# Patient Record
(demographics unavailable — no encounter records)

---

## 2025-01-24 NOTE — CONSULT LETTER
[Dear  ___] : Dear  [unfilled], [Consult Letter:] : I had the pleasure of evaluating your patient, [unfilled]. [Please see my note below.] : Please see my note below. [Consult Closing:] : Thank you very much for allowing me to participate in the care of this patient.  If you have any questions, please do not hesitate to contact me. [Sincerely,] : Sincerely, [FreeTextEntry3] : Kathy Hernandez MD Director, Pediatric Epilepsy [facility] , Pediatric Neurology Residency , [employer] [facility] at [address], [address] [place] [address] Phone: [phone] Fax: [phone]

## 2025-01-24 NOTE — PHYSICAL EXAM
[Well-appearing] : well-appearing [Normocephalic] : normocephalic [No dysmorphic facial features] : no dysmorphic facial features [No ocular abnormalities] : no ocular abnormalities [Neck supple] : neck supple [Soft] : soft [No organomegaly] : no organomegaly [Straight] : straight [No deformities] : no deformities [Alert] : alert [Well related, good eye contact] : well related, good eye contact [Conversant] : conversant [Normal speech and language] : normal speech and language [Follows instructions well] : follows instructions well [VFF] : VFF [Pupils reactive to light and accommodation] : pupils reactive to light and accommodation [Full extraocular movements] : full extraocular movements [Saccadic and smooth pursuits intact] : saccadic and smooth pursuits intact [No nystagmus] : no nystagmus [No papilledema] : no papilledema [Normal facial sensation to light touch] : normal facial sensation to light touch [No facial asymmetry or weakness] : no facial asymmetry or weakness [Gross hearing intact] : gross hearing intact [Equal palate elevation] : equal palate elevation [Good shoulder shrug] : good shoulder shrug [Normal tongue movement] : normal tongue movement [Midline tongue, no fasciculations] : midline tongue, no fasciculations [Normal axial and appendicular muscle tone] : normal axial and appendicular muscle tone [Gets up on table without difficulty] : gets up on table without difficulty [No pronator drift] : no pronator drift [Normal finger tapping and fine finger movements] : normal finger tapping and fine finger movements [No abnormal involuntary movements] : no abnormal involuntary movements [5/5 strength in proximal and distal muscles of arms and legs] : 5/5 strength in proximal and distal muscles of arms and legs [2+ biceps] : 2+ biceps [Triceps] : triceps [Knee jerks] : knee jerks [Ankle jerks] : ankle jerks [No ankle clonus] : no ankle clonus [Localizes LT and temperature] : localizes LT and temperature [No dysmetria on FTNT] : no dysmetria on FTNT [Good walking balance] : good walking balance [Normal gait] : normal gait [Able to tandem well] : able to tandem well [Negative Romberg] : negative Romberg [de-identified] : large cafe au lait spot on one side of the midline in lumbat region

## 2025-01-24 NOTE — ASSESSMENT
[FreeTextEntry1] : 14 yo boy with two  episodes of possible myoclonic seizures vs another etiology ( cardiac/ vasovagal).  I will get REEG and AEEG.

## 2025-01-24 NOTE — PHYSICAL EXAM
[Well-appearing] : well-appearing [Normocephalic] : normocephalic [No dysmorphic facial features] : no dysmorphic facial features [No ocular abnormalities] : no ocular abnormalities [Neck supple] : neck supple [Soft] : soft [No organomegaly] : no organomegaly [Straight] : straight [No deformities] : no deformities [Alert] : alert [Well related, good eye contact] : well related, good eye contact [Conversant] : conversant [Normal speech and language] : normal speech and language [Follows instructions well] : follows instructions well [VFF] : VFF [Pupils reactive to light and accommodation] : pupils reactive to light and accommodation [Full extraocular movements] : full extraocular movements [Saccadic and smooth pursuits intact] : saccadic and smooth pursuits intact [No nystagmus] : no nystagmus [No papilledema] : no papilledema [Normal facial sensation to light touch] : normal facial sensation to light touch [No facial asymmetry or weakness] : no facial asymmetry or weakness [Gross hearing intact] : gross hearing intact [Equal palate elevation] : equal palate elevation [Good shoulder shrug] : good shoulder shrug [Normal tongue movement] : normal tongue movement [Midline tongue, no fasciculations] : midline tongue, no fasciculations [Normal axial and appendicular muscle tone] : normal axial and appendicular muscle tone [Gets up on table without difficulty] : gets up on table without difficulty [No pronator drift] : no pronator drift [Normal finger tapping and fine finger movements] : normal finger tapping and fine finger movements [No abnormal involuntary movements] : no abnormal involuntary movements [5/5 strength in proximal and distal muscles of arms and legs] : 5/5 strength in proximal and distal muscles of arms and legs [2+ biceps] : 2+ biceps [Triceps] : triceps [Knee jerks] : knee jerks [Ankle jerks] : ankle jerks [No ankle clonus] : no ankle clonus [Localizes LT and temperature] : localizes LT and temperature [No dysmetria on FTNT] : no dysmetria on FTNT [Good walking balance] : good walking balance [Normal gait] : normal gait [Able to tandem well] : able to tandem well [Negative Romberg] : negative Romberg [de-identified] : large cafe au lait spot on one side of the midline in lumbat region

## 2025-01-24 NOTE — HISTORY OF PRESENT ILLNESS
[FreeTextEntry1] : Ricky is a R handed healthy 14 yo boy who had an episode of seizure like activity. He woke up, was sitting and stood up to go for breakfast, he fell. His mother heard a fall, he was on his fours, his eyes were glazed over, his head was slowly going back and forth. This was in last week of December 2024. He had head jerking a week prior to that, not seen by anyone. No staring episode or other myoclonic jerks.The night prior he slept late and played video games for 8-10 hours. He denies pre-syncopal symptoms/ palpitations or aura, said his vision was  bit blurry. No headaches. Unclear if he was a bit confused after the episode, no shaking/ tongue bite or incontinence.  No family h/o seizures, his paternal GM had GBM. He was evaluated for ADHD in early elementary school, medication was recommended but not used. He is in AP classes and gets straight As. He has a  segmental cafe au lait spot on his back.

## 2025-01-24 NOTE — CONSULT LETTER
[Dear  ___] : Dear  [unfilled], [Consult Letter:] : I had the pleasure of evaluating your patient, [unfilled]. [Please see my note below.] : Please see my note below. [Consult Closing:] : Thank you very much for allowing me to participate in the care of this patient.  If you have any questions, please do not hesitate to contact me. [Sincerely,] : Sincerely, [FreeTextEntry3] : Kathy Hernandez MD Director, Pediatric Epilepsy [facility] , Pediatric Neurology Residency , [employer] [facility] at [address], [address] [place] [address] Phone: [phone] Fax: [phone]

## 2025-01-24 NOTE — HISTORY OF PRESENT ILLNESS
[FreeTextEntry1] : Ricky is a R handed healthy 12 yo boy who had an episode of seizure like activity. He woke up, was sitting and stood up to go for breakfast, he fell. His mother heard a fall, he was on his fours, his eyes were glazed over, his head was slowly going back and forth. This was in last week of December 2024. He had head jerking a week prior to that, not seen by anyone. No staring episode or other myoclonic jerks.The night prior he slept late and played video games for 8-10 hours. He denies pre-syncopal symptoms/ palpitations or aura, said his vision was  bit blurry. No headaches. Unclear if he was a bit confused after the episode, no shaking/ tongue bite or incontinence.  No family h/o seizures, his paternal GM had GBM. He was evaluated for ADHD in early elementary school, medication was recommended but not used. He is in AP classes and gets straight As. He has a  segmental cafe au lait spot on his back.

## 2025-01-24 NOTE — BIRTH HISTORY
[ Section] : by  section [Failure to Progress] : failure to progress [FreeTextEntry6] : PROM, jaundice, fever in the mother.